# Patient Record
Sex: MALE | Race: WHITE | URBAN - METROPOLITAN AREA
[De-identification: names, ages, dates, MRNs, and addresses within clinical notes are randomized per-mention and may not be internally consistent; named-entity substitution may affect disease eponyms.]

---

## 2018-01-02 ENCOUNTER — HOSPITAL ENCOUNTER (EMERGENCY)
Facility: CLINIC | Age: 53
Discharge: HOME | End: 2018-01-02
Attending: EMERGENCY MEDICINE | Admitting: EMERGENCY MEDICINE
Payer: COMMERCIAL

## 2018-01-02 VITALS
OXYGEN SATURATION: 96 % | SYSTOLIC BLOOD PRESSURE: 135 MMHG | HEART RATE: 97 BPM | RESPIRATION RATE: 16 BRPM | TEMPERATURE: 97.3 F | WEIGHT: 180 LBS | DIASTOLIC BLOOD PRESSURE: 92 MMHG

## 2018-01-02 DIAGNOSIS — F10.220 ACUTE ALCOHOLIC INTOXICATION IN ALCOHOLISM WITHOUT COMPLICATION (H): ICD-10-CM

## 2018-01-02 LAB
ALCOHOL BREATH TEST: 0.37 (ref 0–0.01)
AMPHETAMINES UR QL SCN: NEGATIVE
BARBITURATES UR QL: NEGATIVE
BENZODIAZ UR QL: NEGATIVE
CANNABINOIDS UR QL SCN: NEGATIVE
COCAINE UR QL: NEGATIVE
ETHANOL UR QL SCN: POSITIVE
OPIATES UR QL SCN: NEGATIVE

## 2018-01-02 PROCEDURE — 80307 DRUG TEST PRSMV CHEM ANLYZR: CPT | Performed by: FAMILY MEDICINE

## 2018-01-02 PROCEDURE — 82075 ASSAY OF BREATH ETHANOL: CPT | Performed by: EMERGENCY MEDICINE

## 2018-01-02 PROCEDURE — 99283 EMERGENCY DEPT VISIT LOW MDM: CPT | Mod: Z6 | Performed by: EMERGENCY MEDICINE

## 2018-01-02 PROCEDURE — 80320 DRUG SCREEN QUANTALCOHOLS: CPT | Performed by: FAMILY MEDICINE

## 2018-01-02 PROCEDURE — 25000132 ZZH RX MED GY IP 250 OP 250 PS 637: Performed by: EMERGENCY MEDICINE

## 2018-01-02 PROCEDURE — 99285 EMERGENCY DEPT VISIT HI MDM: CPT | Performed by: EMERGENCY MEDICINE

## 2018-01-02 RX ORDER — LANOLIN ALCOHOL/MO/W.PET/CERES
100 CREAM (GRAM) TOPICAL ONCE
Status: COMPLETED | OUTPATIENT
Start: 2018-01-02 | End: 2018-01-02

## 2018-01-02 RX ORDER — MULTIVITAMIN,THERAPEUTIC
1 TABLET ORAL ONCE
Status: COMPLETED | OUTPATIENT
Start: 2018-01-02 | End: 2018-01-02

## 2018-01-02 RX ORDER — FOLIC ACID 1 MG/1
1 TABLET ORAL ONCE
Status: COMPLETED | OUTPATIENT
Start: 2018-01-02 | End: 2018-01-02

## 2018-01-02 RX ORDER — MAGNESIUM OXIDE 400 MG/1
800 TABLET ORAL ONCE
Status: COMPLETED | OUTPATIENT
Start: 2018-01-02 | End: 2018-01-02

## 2018-01-02 RX ADMIN — MAGNESIUM OXIDE TAB 400 MG (241.3 MG ELEMENTAL MG) 800 MG: 400 (241.3 MG) TAB at 10:58

## 2018-01-02 RX ADMIN — THERA TABS 1 TABLET: TAB at 10:59

## 2018-01-02 RX ADMIN — FOLIC ACID 1 MG: 1 TABLET ORAL at 10:58

## 2018-01-02 RX ADMIN — Medication 100 MG: at 10:58

## 2018-01-02 ASSESSMENT — ENCOUNTER SYMPTOMS
EYE REDNESS: 0
ANAL BLEEDING: 0
SHORTNESS OF BREATH: 0
FEVER: 0
CONFUSION: 0
DIARRHEA: 0
VOMITING: 0
BLOOD IN STOOL: 0
COLOR CHANGE: 0
ARTHRALGIAS: 0
NECK STIFFNESS: 0
ABDOMINAL PAIN: 0
NAUSEA: 0
HEADACHES: 0
CONSTIPATION: 0
DIFFICULTY URINATING: 0

## 2018-01-02 NOTE — ED PROVIDER NOTES
History     Chief Complaint   Patient presents with     Alcohol Intoxication     seeking detox and then get into treatment - has not pre-arranged treatment elsewhere at this time.     HPI  Stevie Denny is a 52-year-old male who presents to the ER with complaints of being drunk in needing help.  Patient states that he was brought in by 2 friends and states that he has been drinking for the last year.  Patient states that he went through treatment approximately a year and a half ago at [facility] and was sober for 2 months after treatment.  Patient states he has never been through any other treatment program.  Patient states that he started drinking again and gradually has worked his way back up to drinking a liter a day.  Patient states his last drink was this morning. He's ahd no vomiting, no diarrhea, melena, or bright blood per rectum, no hematemesis, no previous history of GI bleeds, pancreatitis or alcoholic hepatitis.  Patient states is a non-smoker and denies any suicidal or homicidal ideation.  Patient denies any other drug use.  He presents requesting detox and requesting help.  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     This part of the document was transcribed by Reese Keane, Medical Scribe.     PAST MEDICAL HISTORY: History reviewed. No pertinent past medical history.    PAST SURGICAL HISTORY: History reviewed. No pertinent surgical history.    FAMILY HISTORY: No family history on file.    SOCIAL HISTORY:   Social History   Substance Use Topics     Smoking status: Never Smoker     Smokeless tobacco: Current User      Comment: 1 pouch daily     Alcohol use Yes      Comment: 1.75 L of Vodka daily       Patient's Medications    No medications on file        No Known Allergies      I have reviewed the Medications, Allergies, Past Medical and Surgical History, and Social History in the Epic system.    Review of Systems   Constitutional: Negative for fever.   HENT:  Negative for congestion.    Eyes: Negative for redness.   Respiratory: Negative for shortness of breath.    Cardiovascular: Negative for chest pain.   Gastrointestinal: Negative for abdominal pain, anal bleeding, blood in stool, constipation, diarrhea, nausea and vomiting.   Genitourinary: Negative for difficulty urinating.   Musculoskeletal: Negative for arthralgias and neck stiffness.   Skin: Negative for color change.   Neurological: Negative for headaches.   Psychiatric/Behavioral: Negative for confusion.       Physical Exam   BP: 133/88  Pulse: 102  Temp: 97.2  F (36.2  C)  Resp: 18  Weight: 81.6 kg (180 lb)  SpO2: 100 %      Physical Exam   Constitutional: No distress.   Grossly intoxicated and alert   HENT:   Head: Atraumatic.   Eyes: EOM are normal. Pupils are equal, round, and reactive to light.   Neck: Neck supple.   Cardiovascular: Normal heart sounds.    Pulmonary/Chest: Breath sounds normal.   Abdominal: Soft. There is no tenderness.   Musculoskeletal: He exhibits no edema, tenderness or deformity.   Neurological: He is alert.   Grossly symmetrical strength bilaterally   Skin: No rash noted.   Psychiatric: He has a normal mood and affect.       ED Course     ED Course     Procedures             Labs Ordered and Resulted from Time of ED Arrival Up to the Time of Departure from the ED   ALCOHOL BREATH TEST POCT - Abnormal; Notable for the following:        Result Value    Alcohol Breath Test 0.369 (*)     All other components within normal limits   DRUG ABUSE SCREEN 6 CHEM DEP URINE ([facility])            Assessments & Plan (with Medical Decision Making)     I have reviewed the nursing notes.    Medications   multivitamin, therapeutic (THERA-VIT) tablet 1 tablet (1 tablet Oral Given 1/2/18 1059)   folic acid (FOLVITE) tablet 1 mg (1 mg Oral Given 1/2/18 1058)   thiamine tablet 100 mg (100 mg Oral Given 1/2/18 1058)   magnesium oxide (MAG-OX) tablet 800 mg (800 mg Oral Given 1/2/18 1058)       I have reviewed  the findings, diagnosis, plan and need for follow up with the patient.    Final diagnoses:   Acute alcoholic intoxication in alcoholism without complication (H)     Yamil Denny MD      I, Reese Keane, am serving as a trained medical scribe to document services personally performed by Yamil Denny MD, based on the provider's statements to me.      I, Yamil Denny MD, was physically present and have reviewed and verified the accuracy of this note documented by Reese Keane.      1/2/2018   [facility], [place], EMERGENCY DEPARTMENT     Yamil Denny MD  01/02/18 5674

## 2018-01-02 NOTE — ED NOTES
Pt is seeking detox from alcohol.  Has been drinking 1.75 L Vodka daily.  Last drink about 10am and was brought here by two male friends today.  He chews tobacco daily and is aware that our unit is a locked unit.      He blew a 0.369.  He did not believe that number when I told him the results.  He was shocked.

## 2018-01-02 NOTE — ED AVS SNAPSHOT
[facility], [place], Emergency Department    [address]    [facility] [address]    Phone:  [phone]    Fax:  [phone]                                       Stevie Denny   MRN: [id number]    Department:  [facility], Emergency Department   Date of Visit:  1/2/2018           After Visit Summary Signature Page     I have received my discharge instructions, and my questions have been answered. I have discussed any challenges I see with this plan with the nurse or doctor.    ..........................................................................................................................................  Patient/Patient Representative Signature      ..........................................................................................................................................  Patient Representative Print Name and Relationship to Patient    ..................................................               ................................................  Date                                            Time    ..........................................................................................................................................  Reviewed by Signature/Title    ...................................................              ..............................................  Date                                                            Time

## 2018-01-02 NOTE — ED NOTES
Patient has been in the ER waiting for a detox bed but approached me stating that he would rather go home and take care of some business and does not want detox at this time.  Patient is clinically sober based on exam with good tandem gait and neurologically intact.    Final diagnoses:   Acute alcoholic intoxication in alcoholism without complication (H)       Please call our intake service ([phone]) to arrange a detox bed if you change your mind about wanting treatment.      MD Eduin Pascual Ford Christian, MD  01/02/18 7988

## 2018-01-02 NOTE — DISCHARGE INSTRUCTIONS
Please call our intake service ([phone]) to arrange a detox bed if you change your mind about wanting treatment.

## 2018-01-02 NOTE — PHARMACY-ADMISSION MEDICATION HISTORY
Admission medication history interview status for the 1/2/2018 admission is complete. See Epic admission navigator for allergy information, pharmacy, prior to admission medications and immunization status.     Medication history interview sources:  Patient    Changes made to PTA medication list (reason)  Added: none  Deleted: none  Changed: none    Additional medication history information (including reliability of information, actions taken by pharmacist): The patient reported he does not take any prescription medications, over the counter products, vitamins or supplements.      Prior to Admission medications    None       Medication history completed by: Keiko Eric, PharmD, BCPP

## 2018-01-02 NOTE — ED AVS SNAPSHOT
[facility], Emergency Department    [address]    [place] [address]    Phone:  [phone]    Fax:  [phone]                                       Stevie Denny   MRN: [id number]    Department:  [facility], Emergency Department   Date of Visit:  1/2/2018           Patient Information     Date Of Birth          1965        Your diagnoses for this visit were:     Acute alcoholic intoxication in alcoholism without complication (H)        You were seen by Yamil Denny MD.        Discharge Instructions       Please call our intake service ([phone]) to arrange a detox bed if you change your mind about wanting treatment.    Discharge References/Attachments     GETTING HELP, ALCOHOLISM (ENGLISH)    ALCOHOLISM, UNDERSTANDING (ENGLISH)      24 Hour Appointment Hotline       To make an appointment at any [facility] clinic, call [phone] ([phone]). If you don't have a family doctor or clinic, we will help you find one. [facility] clinics are conveniently located to serve the needs of you and your family.             Review of your medicines      Notice     You have not been prescribed any medications.            Procedures and tests performed during your visit     Alcohol breath test POCT    Drug abuse screen 6 urine (chem dep)    ED Bed Request      Orders Needing Specimen Collection     None      Pending Results     No orders found from 12/31/2017 to 1/3/2018.            Pending Culture Results     No orders found from 12/31/2017 to 1/3/2018.            Pending Results Instructions     If you had any lab results that were not finalized at the time of your Discharge, you can call the ED Lab Result RN at [phone]. You will be contacted by this team for any positive Lab results or changes in treatment. The nurses are available 7 days a week from 10A to 6:30P.  You can leave a message 24 hours per day and they will return your call.        Thank you for choosing  "[facility]       Thank you for choosing [facility] for your care. Our goal is always to provide you with excellent care. Hearing back from our patients is one way we can continue to improve our services. Please take a few minutes to complete the written survey that you may receive in the mail after you visit with us. Thank you!        [employer] Information     [employer] lets you send messages to your doctor, view your test results, renew your prescriptions, schedule appointments and more. To sign up, go to [email or web address] . Click on \"Log in\" on the left side of the screen, which will take you to the Welcome page. Then click on \"Sign up Now\" on the right side of the page.     You will be asked to enter the access code listed below, as well as some personal information. Please follow the directions to create your username and password.     Your access code is: [email or web address]  Expires: 2018  2:45 PM     Your access code will  in 90 days. If you need help or a new code, please call your [facility] clinic or [phone].        Care EveryWhere ID     This is your Care EveryWhere ID. This could be used by other organizations to access your [facility] medical records  [id number]        Equal Access to Services     SANNA MILLER : Krishan phamo [place], waaxda luqadaha, qaybta kaalmada adejosephyada, duc pierre. So [facility] [phone].    ATENCIÓN: Si habla español, tiene a josue disposición servicios gratuitos de asistencia lingüística. Llame al [phone].    We comply with applicable federal civil rights laws and [place] laws. We do not discriminate on the basis of race, color, national origin, age, disability, sex, sexual orientation, or gender identity.            After Visit Summary       This is your record. Keep this with you and show to your community pharmacist(s) and doctor(s) at your next visit.                  "